# Patient Record
Sex: MALE | Race: WHITE | Employment: FULL TIME | URBAN - METROPOLITAN AREA
[De-identification: names, ages, dates, MRNs, and addresses within clinical notes are randomized per-mention and may not be internally consistent; named-entity substitution may affect disease eponyms.]

---

## 2023-07-17 ENCOUNTER — OFFICE VISIT (OUTPATIENT)
Dept: URGENT CARE | Age: 43
End: 2023-07-17

## 2023-07-17 VITALS
DIASTOLIC BLOOD PRESSURE: 96 MMHG | RESPIRATION RATE: 12 BRPM | WEIGHT: 225 LBS | SYSTOLIC BLOOD PRESSURE: 144 MMHG | OXYGEN SATURATION: 96 % | TEMPERATURE: 98.4 F | HEIGHT: 71 IN | HEART RATE: 71 BPM | BODY MASS INDEX: 31.5 KG/M2

## 2023-07-17 DIAGNOSIS — J03.80 ACUTE BACTERIAL TONSILLITIS: Primary | ICD-10-CM

## 2023-07-17 DIAGNOSIS — R03.0 ELEVATED BP WITHOUT DIAGNOSIS OF HYPERTENSION: ICD-10-CM

## 2023-07-17 DIAGNOSIS — B96.89 ACUTE BACTERIAL TONSILLITIS: Primary | ICD-10-CM

## 2023-07-17 LAB — STREPTOCOCCUS A RNA: NEGATIVE

## 2023-07-17 RX ORDER — AMOXICILLIN 500 MG/1
500 CAPSULE ORAL 2 TIMES DAILY
Qty: 20 CAPSULE | Refills: 0 | Status: SHIPPED | OUTPATIENT
Start: 2023-07-17 | End: 2023-07-27

## 2023-07-17 ASSESSMENT — ENCOUNTER SYMPTOMS
SINUS PRESSURE: 0
COUGH: 1
SHORTNESS OF BREATH: 0
SORE THROAT: 1
RHINORRHEA: 1
SINUS PAIN: 0
DIARRHEA: 0
VOMITING: 0

## 2023-07-17 NOTE — PATIENT INSTRUCTIONS
Rapid strep A negative in clinic today. Results reviewed with patient. Treating based on exudate and symptoms. Complete antibiotics as prescribed. An over the counter probiotic is also recommended. Rest, hydrate, OTC symptom relief. Monitor BP at home and call PCP if persistently elevated. Follow up with PCP in 5 days if symptoms persist or if symptoms worsen.   New Prescriptions    AMOXICILLIN (AMOXIL) 500 MG CAPSULE    Take 1 capsule by mouth 2 times daily for 10 days

## 2023-07-17 NOTE — PROGRESS NOTES
Respiratory:  Positive for cough (dry cough). Negative for shortness of breath. Gastrointestinal:  Negative for diarrhea and vomiting. Neurological:  Negative for headaches. All other systems reviewed and are negative. Physical Exam  Vitals reviewed. Constitutional:       General: He is not in acute distress. Appearance: Normal appearance. He is ill-appearing and diaphoretic. HENT:      Head: Normocephalic. Right Ear: Tympanic membrane, ear canal and external ear normal.      Left Ear: Tympanic membrane, ear canal and external ear normal.      Nose: Nose normal.      Mouth/Throat:      Lips: Pink. Mouth: Mucous membranes are moist.      Pharynx: Uvula midline. Oropharyngeal exudate and posterior oropharyngeal erythema present. Tonsils: Tonsillar exudate present. No tonsillar abscesses. 1+ on the right. 1+ on the left. Eyes:      Pupils: Pupils are equal, round, and reactive to light. Cardiovascular:      Rate and Rhythm: Normal rate and regular rhythm. Heart sounds: Normal heart sounds. Pulmonary:      Effort: Pulmonary effort is normal.      Breath sounds: Normal breath sounds. Lymphadenopathy:      Cervical: No cervical adenopathy. Neurological:      Mental Status: He is alert and oriented to person, place, and time. Psychiatric:         Mood and Affect: Mood normal.         Behavior: Behavior normal.         An electronic signature was used to authenticate this note.     --KATHARINE Alberts - CNP